# Patient Record
Sex: FEMALE | Race: BLACK OR AFRICAN AMERICAN | NOT HISPANIC OR LATINO
[De-identification: names, ages, dates, MRNs, and addresses within clinical notes are randomized per-mention and may not be internally consistent; named-entity substitution may affect disease eponyms.]

---
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
## 2025-01-25 ENCOUNTER — HOSPITAL ENCOUNTER (EMERGENCY)
Facility: HOSPITAL | Age: 20
Discharge: HOME | End: 2025-01-25
Attending: EMERGENCY MEDICINE
Payer: COMMERCIAL

## 2025-01-25 VITALS
OXYGEN SATURATION: 98 % | WEIGHT: 115 LBS | TEMPERATURE: 99 F | SYSTOLIC BLOOD PRESSURE: 136 MMHG | DIASTOLIC BLOOD PRESSURE: 79 MMHG | HEART RATE: 89 BPM | RESPIRATION RATE: 18 BRPM

## 2025-01-25 DIAGNOSIS — J02.9 VIRAL PHARYNGITIS: Primary | ICD-10-CM

## 2025-01-25 LAB — BETA STREP GRP A SCREEN: NEGATIVE

## 2025-01-25 PROCEDURE — 87430 STREP A AG IA: CPT | Performed by: EMERGENCY MEDICINE

## 2025-01-25 PROCEDURE — 99283 EMERGENCY DEPT VISIT LOW MDM: CPT

## 2025-01-25 PROCEDURE — 99284 EMERGENCY DEPT VISIT MOD MDM: CPT

## 2025-01-25 RX ORDER — ONDANSETRON 4 MG/1
4 TABLET, ORALLY DISINTEGRATING ORAL EVERY 4 HOURS PRN
Qty: 10 TABLET | Refills: 0 | Status: SHIPPED | OUTPATIENT
Start: 2025-01-25 | End: 2025-02-01

## 2025-01-25 RX ORDER — BENZOCAINE AND DEXTROMETHORPHAN HYDROBROMIDE 7.5; 5 MG/1; MG/1
1 LOZENGE ORAL
Qty: 20 LOZENGE | Refills: 0 | Status: SHIPPED | OUTPATIENT
Start: 2025-01-25 | End: 2025-02-01

## 2025-01-25 RX ORDER — IBUPROFEN 600 MG/1
600 TABLET, FILM COATED ORAL EVERY 8 HOURS PRN
Qty: 30 TABLET | Refills: 0 | Status: SHIPPED | OUTPATIENT
Start: 2025-01-25 | End: 2025-02-01

## 2025-01-25 NOTE — ED PROVIDER NOTES
Patient Seen in: [facility] Emergency Department    History     Chief Complaint   Patient presents with    Sore Throat       HPI    19-year-old female presents to the ED for evaluation of sore throat.  She states that started last night and she has had some nausea but no vomiting.  No fevers, cough.  No other exacerbating or relieving factors.  She states she feels quite a bit of burning in the back of her throat when she tries to swallow.    History from Independent Source:       External Records Reviewed:     History reviewed. History reviewed. No pertinent past medical history.    History reviewed. History reviewed. No pertinent surgical history.      Medications :  Prescriptions Prior to Admission[1]     No family history on file.    Smoking Status:   Social History     Socioeconomic History    Marital status: Single   Tobacco Use    Smoking status: Never    Smokeless tobacco: Never   Substance and Sexual Activity    Drug use: Yes     Types: Cannabis       Constitutional and vital signs reviewed.      Social History and Family History elements reviewed from today, pertinent positives to the presenting problem noted.    Physical Exam     ED Triage Vitals [01/25/25 1544]   /79   Pulse 89   Resp 18   Temp 98.6 °F (37 °C)   Temp src Temporal   SpO2 98 %   O2 Device None (Room air)       Physical Exam   Constitutional: AAOx3, well nourished, NAD  HEENT: Normocephalic, PERRLA, MMM, posterior oropharynx normal with no exudates, midline uvula  CV: s1s2+, RRR, no m/r/g, normal distal pulses  Pulmonary/Chest: CTA b/l with no rales, wheezes.  No chest wall tenderness  Abdominal: Nontender.  Nondistended. Soft. Bowel sounds are normal.   Neck/Back:   :   Musculoskeletal: Normal range of motion. No deformity.   Neurological: Awake, alert. Normal reflexes. No cranial nerve deficit.    Skin: Skin is warm and dry. No rash noted. No erythema.   Psychiatric:      All measures to prevent infection transmission  during my interaction with the patient were taken. The patient was already wearing a droplet mask on my arrival to the room. Personal protective equipment was worn throughout the duration of the exam.      ED Course        Labs Reviewed   RAPID STREP A SCREEN (LC) - Normal    Narrative:     A confirmatory culture is recommended if clinically indicated.     My Independent Interpretation of EKG (if performed):     Imaging Results Available and Reviewed while in ED: No results found.  ED Medications Administered: Medications - No data to display          MDM     Vitals:    01/25/25 1544   BP: 136/79   Pulse: 89   Resp: 18   Temp: 98.6 °F (37 °C)   TempSrc: Temporal   SpO2: 98%   Weight: 52.2 kg     *I personally reviewed and interpreted all ED vitals.    Independent Interpretation of Studies:     Social Determinants of Health:     Procedures:      Differential/MDM/Shared Decision Making: Differential Diagnosis includes viral pharyngitis, strep pharyngitis, PTA, retropharyngeal abscess, others.     The patient already  has no past medical history on file.  to contribute to the complexity of this ED evaluation.           Medications, Diagnostics, or Disposition considered but not done:     Strep test is negative.  Management of case was discussed with patient and she understands symptoms are likely viral in origin.  Will discharge on Cepacol, ibuprofen, Zofran.      Condition upon leaving the department: Stable    Disposition and Plan     Clinical Impression:  1. Viral pharyngitis        Disposition:  Discharge    Follow-up:  Keely Corea MD  [address] [id number] [phone]    Call in 2 day(s)        Medications Prescribed:  Current Discharge Medication List        START taking these medications    Details   ibuprofen 600 MG Oral Tab Take 1 tablet (600 mg total) by mouth every 8 (eight) hours as needed for Pain or Fever.  Qty: 30 tablet, Refills: 0      Dextromethorphan-Benzocaine  (CEPACOL SORE THROAT & COUGH) 5-7.5 MG Mouth/Throat Lozenge Use as directed 1 lozenge in the mouth or throat every 4 to 6 hours for 7 days.  Qty: 20 lozenge, Refills: 0      ondansetron 4 MG Oral Tablet Dispersible Take 1 tablet (4 mg total) by mouth every 4 (four) hours as needed for Nausea.  Qty: 10 tablet, Refills: 0                      [1] (Not in a hospital admission)

## 2025-01-25 NOTE — ED INITIAL ASSESSMENT (HPI)
Pt arrives ambulatory to ED for sore throat since last night. +NV. Aox4, speaking in full sentences.